# Patient Record
Sex: MALE | Race: BLACK OR AFRICAN AMERICAN | Employment: PART TIME | URBAN - METROPOLITAN AREA
[De-identification: names, ages, dates, MRNs, and addresses within clinical notes are randomized per-mention and may not be internally consistent; named-entity substitution may affect disease eponyms.]

---

## 2022-01-13 ENCOUNTER — HOSPITAL ENCOUNTER (EMERGENCY)
Age: 22
Discharge: HOME | End: 2022-01-13
Payer: COMMERCIAL

## 2022-01-13 VITALS
HEART RATE: 67 BPM | WEIGHT: 194 LBS | RESPIRATION RATE: 17 BRPM | SYSTOLIC BLOOD PRESSURE: 141 MMHG | DIASTOLIC BLOOD PRESSURE: 92 MMHG | TEMPERATURE: 98.4 F | OXYGEN SATURATION: 98 %

## 2022-01-13 DIAGNOSIS — T16.1XXA FOREIGN BODY OF RIGHT EAR, INITIAL ENCOUNTER: Primary | ICD-10-CM

## 2022-01-13 PROCEDURE — 69200 CLEAR OUTER EAR CANAL: CPT

## 2022-01-13 PROCEDURE — 99283 EMERGENCY DEPT VISIT LOW MDM: CPT

## 2022-01-13 RX ORDER — DOXYCYCLINE 100 MG/1
100 TABLET ORAL 2 TIMES DAILY
Qty: 20 TABLET | Refills: 0 | Status: SHIPPED | OUTPATIENT
Start: 2022-01-13 | End: 2022-01-23

## 2022-01-13 ASSESSMENT — ENCOUNTER SYMPTOMS
COUGH: 0
FACIAL SWELLING: 0
TROUBLE SWALLOWING: 0
ABDOMINAL PAIN: 0
COLOR CHANGE: 0
NAUSEA: 0
VOMITING: 0
VOICE CHANGE: 0
RESPIRATORY NEGATIVE: 1
SHORTNESS OF BREATH: 0
BACK PAIN: 0

## 2022-01-14 NOTE — ED PROVIDER NOTES
for cough and shortness of breath. Cardiovascular: Negative. Negative for chest pain. Gastrointestinal: Negative for abdominal pain, nausea and vomiting. Genitourinary: Negative for difficulty urinating and dysuria. Musculoskeletal: Negative for arthralgias, back pain, myalgias, neck pain and neck stiffness. Skin: Positive for wound. Negative for color change, pallor and rash. Neurological: Negative for dizziness, light-headedness and headaches. Positives and Pertinent negatives as per HPI. Except as noted above in the ROS, all other systems were reviewed and negative. PAST MEDICAL HISTORY   History reviewed. No pertinent past medical history. SURGICAL HISTORY   History reviewed. No pertinent surgical history. CURRENTMEDICATIONS       Previous Medications    No medications on file         ALLERGIES     Patient has no known allergies. FAMILYHISTORY     History reviewed. No pertinent family history. SOCIAL HISTORY       Social History     Tobacco Use    Smoking status: Not on file    Smokeless tobacco: Not on file   Substance Use Topics    Alcohol use: Not on file    Drug use: Not on file       SCREENINGS             PHYSICAL EXAM    (up to 7 for level 4, 8 or more for level 5)     ED Triage Vitals [01/13/22 1906]   BP Temp Temp src Pulse Resp SpO2 Height Weight   (!) 141/92 98.4 °F (36.9 °C) -- 67 17 98 % -- 194 lb (88 kg)       Physical Exam  Constitutional:       General: He is not in acute distress. Appearance: Normal appearance. He is well-developed. He is not ill-appearing, toxic-appearing or diaphoretic. HENT:      Head: Normocephalic and atraumatic. Right Ear: Tympanic membrane and ear canal normal. There is no impacted cerumen. Left Ear: Tympanic membrane, ear canal and external ear normal. There is no impacted cerumen.       Ears:      Comments: Upon examination of the right here he has piercing noted to the right earlobe without any earring present at this time. The globe appears to be slightly swollen at this time with what appears to be scab to the posterior aspect of the right earlobe. Scab deroofed and small amount of bloody purulent material able to be expressed. He has palpable foreign body noted. Foreign body able to be expressed with pressure through the posterior right earlobe through the piercing site. No residual foreign body noted on examination. No auricular hematoma noted. Canal unremarkable. Nose: Nose normal. No congestion or rhinorrhea. Eyes:      General:         Right eye: No discharge. Left eye: No discharge. Conjunctiva/sclera: Conjunctivae normal.   Pulmonary:      Effort: Pulmonary effort is normal. No respiratory distress. Breath sounds: No stridor. Musculoskeletal:         General: Normal range of motion. Cervical back: Normal range of motion and neck supple. Skin:     General: Skin is warm and dry. Coloration: Skin is not pale. Findings: No erythema or rash. Neurological:      Mental Status: He is alert and oriented to person, place, and time. Psychiatric:         Behavior: Behavior normal.         DIAGNOSTIC RESULTS   LABS:    Labs Reviewed - No data to display    When ordered only abnormal lab results are displayed. All other labs were within normal range or not returned as of this dictation. EKG: When ordered, EKG's are interpreted by the Emergency Department Physician in the absence of a cardiologist.  Please see their note for interpretation of EKG. RADIOLOGY:   Non-plain film images such as CT, Ultrasound and MRI are read by the radiologist. Plain radiographic images are visualized and preliminarily interpreted by the ED Provider with the below findings:        Interpretation per the Radiologist below, if available at the time of this note:    No orders to display     No results found.         PROCEDURES   Unless otherwise noted below, none     Foreign Body    Date/Time: 1/13/2022 7:20 PM  Performed by: RAFFAELE May  Authorized by: RAFFAELE May     Consent:     Consent obtained:  Verbal    Consent given by:  Patient    Risks discussed:  Bleeding, incomplete removal, infection, nerve damage, pain, poor cosmetic result and worsening of condition    Alternatives discussed:  No treatment  Location:     Location:  Ear    Ear location:  R ear    Depth: earlobe. Tendon involvement:  None  Pre-procedure details:     Imaging:  None  Anesthesia (see MAR for exact dosages): Anesthesia method:  None  Procedure type:     Procedure complexity:  Simple  Procedure details:     Localization method:  Visualized and probed    Dissection of underlying tissues: no      Bloodless field: no      Removal mechanism: pressure/palpation. Foreign bodies recovered:  1    Description:  Rubber backing of earring     Intact foreign body removal: yes    Post-procedure details:     Confirmation:  No additional foreign bodies on visualization    Skin closure:  None    Dressing:  Open (no dressing)    Patient tolerance of procedure: Tolerated well, no immediate complications        CRITICAL CARE TIME       CONSULTS:  None      EMERGENCY DEPARTMENT COURSE and DIFFERENTIAL DIAGNOSIS/MDM:   Vitals:    Vitals:    01/13/22 1906   BP: (!) 141/92   Pulse: 67   Resp: 17   Temp: 98.4 °F (36.9 °C)   SpO2: 98%   Weight: 194 lb (88 kg)       Patient was given the following medications:  Medications - No data to display        Patient is a 81-year-old male who presents to the ED with complaint of a foreign body. Upon examination the black rubber piece of his hearing on the right appears to be within the earlobe. Upon examination of the area he has a small scab to the area. Scab was deroofed and a moderate amount of bloody purulent drainage was expressed. Concern for potential infected ear piercing with retained piece of earring at this time.   Using pressure the small rubber piece was able to be visualized and pushed through the posterior opening of the piercing and removed without difficulty. No further foreign body noted on examination/palpation. Will be discharged home with antibiotics for concern for infected ear piercing. He is instructed not to place piercing in the right ear at this time. He is instructed that the piercing may close up and he may need to have the ear repeated at this time. We will discharge him with doxycycline for empiric antibiotic coverage. Instructed on proper wound care. Low suspicion for retained foreign body, auricular hematoma or other emergent etiology at this time. FINAL IMPRESSION      1. Foreign body of right ear, initial encounter          DISPOSITION/PLAN   DISPOSITION Decision To Discharge 01/13/2022 07:14:17 PM      PATIENT REFERRED TO:  [facility]  [address] [facility]  [phone]  Go to   As needed, If symptoms worsen      DISCHARGE MEDICATIONS:  New Prescriptions    DOXYCYCLINE MONOHYDRATE (ADOXA) 100 MG TABLET    Take 1 tablet by mouth 2 times daily for 10 days May substitute another form of Doxycycline if insurance requires.        DISCONTINUED MEDICATIONS:  Discontinued Medications    No medications on file              (Please note that portions of this note were completed with a voice recognition program.  Efforts were made to edit the dictations but occasionally words are mis-transcribed.)    RAFFAELE Garner (electronically signed)          RAFFAELE Mota  01/13/22 [id number]